# Patient Record
Sex: FEMALE | Race: WHITE | Employment: STUDENT | URBAN - METROPOLITAN AREA
[De-identification: names, ages, dates, MRNs, and addresses within clinical notes are randomized per-mention and may not be internally consistent; named-entity substitution may affect disease eponyms.]

---

## 2019-08-23 ENCOUNTER — DOCTOR'S OFFICE (OUTPATIENT)
Dept: URBAN - METROPOLITAN AREA CLINIC 125 | Facility: CLINIC | Age: 19
Setting detail: OPHTHALMOLOGY
End: 2019-08-23
Payer: COMMERCIAL

## 2019-08-23 ENCOUNTER — RX ONLY (RX ONLY)
Age: 19
End: 2019-08-23

## 2019-08-23 DIAGNOSIS — H52.13: ICD-10-CM

## 2019-08-23 PROBLEM — H10.413 CONJUNCTIVITIS, GIANT PAPILLARY CHRONIC; BOTH EYES: Status: ACTIVE | Noted: 2019-08-23

## 2019-08-23 PROCEDURE — 92004 COMPRE OPH EXAM NEW PT 1/>: CPT | Performed by: OPTOMETRIST

## 2019-08-23 PROCEDURE — 92310 CONTACT LENS FITTING OU: CPT | Performed by: OPTOMETRIST

## 2019-08-23 PROCEDURE — 92015 DETERMINE REFRACTIVE STATE: CPT | Performed by: OPTOMETRIST

## 2019-08-23 ASSESSMENT — AXIALLENGTH_DERIVED
OD_AL: 24.3695
OS_AL: 24.0054
OD_AL: 24.4216
OS_AL: 24.2601

## 2019-08-23 ASSESSMENT — SPHEQUIV_DERIVED
OS_SPHEQUIV: -1.75
OS_SPHEQUIV: -2.375
OD_SPHEQUIV: -2.875
OD_SPHEQUIV: -3

## 2019-08-23 ASSESSMENT — REFRACTION_MANIFEST
OS_VA1: 20/20
OD_VA1: 20/
OU_VA: 20/
OD_VA3: 20/
OD_SPHERE: -2.75
OD_AXIS: 010
OD_VA1: 20/20
OS_VA2: 20/
OS_CYLINDER: -0.75
OU_VA: 20/
OS_VA1: 20/
OD_VA2: 20/
OD_CYLINDER: -0.50
OD_VA2: 20/
OS_VA3: 20/
OS_VA2: 20/
OS_SPHERE: -2.00
OS_AXIS: 015
OD_VA3: 20/
OS_VA3: 20/

## 2019-08-23 ASSESSMENT — REFRACTION_CURRENTRX
OD_OVR_VA: 20/
OD_SPHERE: -2.75
OD_OVR_VA: 20/
OS_AXIS: 155
OS_SPHERE: -2.00
OD_CYLINDER: SPHERE
OS_CYLINDER: -0.25
OS_VPRISM_DIRECTION: SV
OS_OVR_VA: 20/
OD_VPRISM_DIRECTION: SV
OD_OVR_VA: 20/

## 2019-08-23 ASSESSMENT — REFRACTION_AUTOREFRACTION
OD_SPHERE: -2.50
OS_CYLINDER: -1.00
OS_AXIS: 160
OD_AXIS: 009
OS_SPHERE: -1.25
OD_CYLINDER: -0.75

## 2019-08-23 ASSESSMENT — KERATOMETRY
OS_AXISANGLE_DEGREES: 084
OD_K2POWER_DIOPTERS: 45.25
METHOD_AUTO_MANUAL: AUTO
OS_K2POWER_DIOPTERS: 45.00
OD_AXISANGLE_DEGREES: 093
OD_K1POWER_DIOPTERS: 43.75
OS_K1POWER_DIOPTERS: 43.50

## 2019-08-23 ASSESSMENT — CONFRONTATIONAL VISUAL FIELD TEST (CVF)
OS_FINDINGS: FULL
OD_FINDINGS: FULL

## 2019-08-23 ASSESSMENT — VISUAL ACUITY
OD_BCVA: 20/25-2
OS_BCVA: 20/30-2

## 2019-09-25 ENCOUNTER — OPTICAL OFFICE (OUTPATIENT)
Dept: URBAN - METROPOLITAN AREA CLINIC 134 | Facility: CLINIC | Age: 19
Setting detail: OPHTHALMOLOGY
End: 2019-09-25

## 2019-09-25 DIAGNOSIS — H52.13: ICD-10-CM

## 2019-09-25 PROCEDURE — S0500 DISPOS CONT LENS: HCPCS | Performed by: OPTOMETRIST

## 2022-05-19 ENCOUNTER — HOSPITAL ENCOUNTER (OUTPATIENT)
Dept: ULTRASOUND IMAGING | Facility: HOSPITAL | Age: 22
Discharge: HOME | End: 2022-05-19
Payer: COMMERCIAL

## 2022-05-19 DIAGNOSIS — E01.0 IODINE-DEFICIENCY RELATED DIFFUSE (ENDEMIC) GOITER: ICD-10-CM

## 2022-05-19 DIAGNOSIS — E06.3 AUTOIMMUNE THYROIDITIS: ICD-10-CM

## 2022-05-19 PROCEDURE — 76536 US EXAM OF HEAD AND NECK: CPT

## 2022-06-24 ENCOUNTER — HOSPITAL ENCOUNTER (EMERGENCY)
Facility: HOSPITAL | Age: 22
Discharge: HOME | End: 2022-06-24
Attending: EMERGENCY MEDICINE
Payer: COMMERCIAL

## 2022-06-24 VITALS
OXYGEN SATURATION: 100 % | DIASTOLIC BLOOD PRESSURE: 82 MMHG | RESPIRATION RATE: 20 BRPM | SYSTOLIC BLOOD PRESSURE: 132 MMHG | TEMPERATURE: 99.1 F | HEIGHT: 68 IN | BODY MASS INDEX: 21.72 KG/M2 | WEIGHT: 143.3 LBS | HEART RATE: 90 BPM

## 2022-06-24 DIAGNOSIS — H60.391 ACUTE INFECTIVE OTITIS EXTERNA, RIGHT: Primary | ICD-10-CM

## 2022-06-24 DIAGNOSIS — H60.11 CELLULITIS OF EXTERNAL EAR, RIGHT: ICD-10-CM

## 2022-06-24 PROCEDURE — 99284 EMERGENCY DEPT VISIT MOD MDM: CPT | Performed by: EMERGENCY MEDICINE

## 2022-06-24 PROCEDURE — 99283 EMERGENCY DEPT VISIT LOW MDM: CPT

## 2022-06-24 RX ORDER — BUSPIRONE HYDROCHLORIDE 10 MG/1
10 TABLET ORAL
COMMUNITY
Start: 2022-03-21

## 2022-06-24 RX ORDER — ONDANSETRON 8 MG/1
TABLET, ORALLY DISINTEGRATING ORAL AS NEEDED
COMMUNITY
Start: 2022-05-06

## 2022-06-24 RX ORDER — HYDROCODONE BITARTRATE AND ACETAMINOPHEN 5; 325 MG/1; MG/1
1 TABLET ORAL ONCE
Status: COMPLETED | OUTPATIENT
Start: 2022-06-24 | End: 2022-06-24

## 2022-06-24 RX ORDER — AMOXICILLIN AND CLAVULANATE POTASSIUM 875; 125 MG/1; MG/1
1 TABLET, FILM COATED ORAL EVERY 12 HOURS
Qty: 20 TABLET | Refills: 0 | Status: SHIPPED | OUTPATIENT
Start: 2022-06-24 | End: 2022-07-04

## 2022-06-24 RX ORDER — HYDROCODONE BITARTRATE AND ACETAMINOPHEN 5; 325 MG/1; MG/1
1 TABLET ORAL EVERY 6 HOURS PRN
Qty: 10 TABLET | Refills: 0 | Status: SHIPPED | OUTPATIENT
Start: 2022-06-24

## 2022-06-24 RX ORDER — AMOXICILLIN AND CLAVULANATE POTASSIUM 875; 125 MG/1; MG/1
1 TABLET, FILM COATED ORAL ONCE
Status: COMPLETED | OUTPATIENT
Start: 2022-06-24 | End: 2022-06-24

## 2022-06-24 RX ORDER — AMOXICILLIN 500 MG/1
500 TABLET, FILM COATED ORAL 2 TIMES DAILY
COMMUNITY
Start: 2022-06-22 | End: 2022-07-02

## 2022-06-24 RX ORDER — NEOMYCIN SULFATE, POLYMYXIN B SULFATE AND HYDROCORTISONE 10; 3.5; 1 MG/ML; MG/ML; [USP'U]/ML
4 SUSPENSION/ DROPS AURICULAR (OTIC) ONCE
Status: DISCONTINUED | OUTPATIENT
Start: 2022-06-24 | End: 2022-06-24

## 2022-06-24 RX ORDER — CIPROFLOXACIN AND DEXAMETHASONE 3; 1 MG/ML; MG/ML
4 SUSPENSION/ DROPS AURICULAR (OTIC) ONCE
Status: COMPLETED | OUTPATIENT
Start: 2022-06-24 | End: 2022-06-24

## 2022-06-24 RX ORDER — OFLOXACIN 3 MG/ML
10 SOLUTION AURICULAR (OTIC) DAILY
COMMUNITY
Start: 2022-06-22 | End: 2022-06-29

## 2022-06-24 RX ORDER — CIPROFLOXACIN AND DEXAMETHASONE 3; 1 MG/ML; MG/ML
4 SUSPENSION/ DROPS AURICULAR (OTIC) 2 TIMES DAILY
Qty: 7.5 ML | Refills: 0 | Status: SHIPPED | OUTPATIENT
Start: 2022-06-24

## 2022-06-24 RX ORDER — LANSOPRAZOLE 30 MG/1
30 CAPSULE, DELAYED RELEASE ORAL DAILY
COMMUNITY
Start: 2022-05-06

## 2022-06-24 RX ADMIN — HYDROCODONE BITARTRATE AND ACETAMINOPHEN 1 TABLET: 5; 325 TABLET ORAL at 11:44

## 2022-06-24 RX ADMIN — CIPROFLOXACIN AND DEXAMETHASONE 4 DROP: 3; 1 SUSPENSION/ DROPS AURICULAR (OTIC) at 11:40

## 2022-06-24 RX ADMIN — AMOXICILLIN AND CLAVULANATE POTASSIUM 1 TABLET: 875; 125 TABLET, FILM COATED ORAL at 11:39

## 2022-06-24 NOTE — Clinical Note
Torrey Turner was seen and treated in our emergency department on 6/24/2022  No school or work today and tomorrow    Diagnosis:     Rashad Young    She may return on this date: If you have any questions or concerns, please don't hesitate to call        Lyndsey Flores, DO    ______________________________           _______________          _______________  Hospital Representative                              Date                                Time

## 2022-06-24 NOTE — ED PROVIDER NOTES
History  Chief Complaint   Patient presents with    Earache     Pt arrives reporting right ear pain that is worsening over past 3 days  Pt was seen at urgent care and prescribed ABX but is not getting better and now is concerned she is developing infection in right ear as well  Pt last took tylenol at 0600 today  Patient is a 78-year-old female presents emergency department complaining of pain and swelling and discharge from the right ear was started on ear drops ofloxacin and antibiotics amoxicillin for otitis media at urgent care 3 days ago pain in the right ear worsening and not controlled with Tylenol  Patient reports she had been swimming prior to developing the ear infection as well as cleans her ears very aggressively  Pain from ear radiates into the right jaw and neck  No pain or tenderness or swelling behind the ear  History provided by:  Patient and parent  Earache  Location:  Right  Behind ear:  No abnormality  Quality:  Aching and throbbing  Severity:  Moderate  Onset quality:  Gradual  Duration:  4 days  Timing:  Constant  Progression:  Worsening  Context: water in ear    Associated symptoms: ear discharge    Associated symptoms: no abdominal pain, no congestion, no cough, no diarrhea, no fever, no headaches, no rash, no rhinorrhea, no sore throat and no vomiting        Prior to Admission Medications   Prescriptions Last Dose Informant Patient Reported? Taking?   amoxicillin (AMOXIL) 500 MG tablet   Yes Yes   Sig: Take 500 mg by mouth 2 (two) times a day   busPIRone (BUSPAR) 10 mg tablet   Yes Yes   Sig: Take 10 mg by mouth   lansoprazole (PREVACID) 30 mg capsule   Yes Yes   Sig: Take 30 mg by mouth daily   ofloxacin (FLOXIN) 0 3 % otic solution   Yes Yes   Sig: Administer 10 drops into ears daily   ondansetron (ZOFRAN-ODT) 8 mg disintegrating tablet   Yes Yes   Sig: if needed      Facility-Administered Medications: None       History reviewed  No pertinent past medical history      Past Surgical History:   Procedure Laterality Date    REFRACTIVE SURGERY      WISDOM TOOTH EXTRACTION         History reviewed  No pertinent family history  I have reviewed and agree with the history as documented  E-Cigarette/Vaping     E-Cigarette/Vaping Substances     Social History     Tobacco Use    Smoking status: Never Smoker    Smokeless tobacco: Never Used   Substance Use Topics    Alcohol use: Never    Drug use: Never       Review of Systems   Constitutional: Negative for activity change, appetite change, chills, fatigue and fever  HENT: Positive for ear discharge and ear pain  Negative for congestion, rhinorrhea and sore throat  Eyes: Negative for discharge, redness and visual disturbance  Respiratory: Negative for cough, chest tightness, shortness of breath and wheezing  Cardiovascular: Negative for chest pain and palpitations  Gastrointestinal: Negative for abdominal pain, constipation, diarrhea, nausea and vomiting  Endocrine: Negative for polydipsia and polyuria  Genitourinary: Negative for difficulty urinating, dysuria, frequency, hematuria and urgency  Musculoskeletal: Negative for arthralgias and myalgias  Skin: Negative for color change, pallor and rash  Neurological: Negative for dizziness, weakness, light-headedness, numbness and headaches  Hematological: Negative for adenopathy  Does not bruise/bleed easily  All other systems reviewed and are negative  Physical Exam  Physical Exam  Vitals and nursing note reviewed  Constitutional:       Appearance: She is well-developed  HENT:      Head: Normocephalic and atraumatic  Right Ear: Drainage, swelling and tenderness present  A middle ear effusion is present  No mastoid tenderness  Tympanic membrane is erythematous  Left Ear: External ear normal       Nose: Nose normal    Eyes:      Conjunctiva/sclera: Conjunctivae normal       Pupils: Pupils are equal, round, and reactive to light     Cardiovascular: Rate and Rhythm: Normal rate and regular rhythm  Heart sounds: Normal heart sounds  Pulmonary:      Effort: Pulmonary effort is normal  No respiratory distress  Breath sounds: Normal breath sounds  No wheezing or rales  Chest:      Chest wall: No tenderness  Abdominal:      General: Bowel sounds are normal  There is no distension  Palpations: Abdomen is soft  Tenderness: There is no abdominal tenderness  There is no guarding  Musculoskeletal:         General: Normal range of motion  Cervical back: Normal range of motion and neck supple  Skin:     General: Skin is warm and dry  Neurological:      Mental Status: She is alert and oriented to person, place, and time  Cranial Nerves: No cranial nerve deficit  Sensory: No sensory deficit           Vital Signs  ED Triage Vitals   Temperature Pulse Respirations Blood Pressure SpO2   06/24/22 1130 06/24/22 1130 06/24/22 1130 06/24/22 1130 06/24/22 1130   99 1 °F (37 3 °C) (!) 113 18 145/93 100 %      Temp src Heart Rate Source Patient Position - Orthostatic VS BP Location FiO2 (%)   -- 06/24/22 1159 06/24/22 1159 06/24/22 1159 --    Monitor Lying Right arm       Pain Score       06/24/22 1130       9           Vitals:    06/24/22 1130 06/24/22 1159   BP: 145/93 132/82   Pulse: (!) 113 90   Patient Position - Orthostatic VS:  Lying         Visual Acuity      ED Medications  Medications   amoxicillin-clavulanate (AUGMENTIN) 875-125 mg per tablet 1 tablet (1 tablet Oral Given 6/24/22 1139)   ciprofloxacin-dexamethasone (CIPRODEX) 0 3-0 1 % otic suspension 4 drop (4 drops Right Ear Given 6/24/22 1140)   HYDROcodone-acetaminophen (NORCO) 5-325 mg per tablet 1 tablet (1 tablet Oral Given 6/24/22 1144)       Diagnostic Studies  Results Reviewed     None                 No orders to display              Procedures  Procedures         ED Course                               SBIRT 20yo+    Flowsheet Row Most Recent Value   SBIRT (21 yo +)    In order to provide better care to our patients, we are screening all of our patients for alcohol and drug use  Would it be okay to ask you these screening questions? No Filed at: 06/24/2022 1131                    [facility]  Number of Diagnoses or Management Options  Acute infective otitis externa, right: new and does not require workup  Cellulitis of external ear, right: new and does not require workup  Diagnosis management comments: Patient is afebrile nontoxic well-appearing clinically and hemodynamically stable in the emergency department history and examination is consistent with acute otitis externa with cellulitis of the external canal will place on Augmentin for increased antibiotic coverage for now and place on Ciprodex drops for increased anti-inflammatory coverage topically  Patient's primary concern is amount of pain control and difficulty with sleeping will prescribe Norco as additional pain control for now advised Tylenol Motrin and supportive care for less severe pain and recommended prompt follow-up with your and nose and throat referral provided for further evaluation and treatment return precautions and anticipatory guidance discussed           Amount and/or Complexity of Data Reviewed  Decide to obtain previous medical records or to obtain history from someone other than the patient: yes  Review and summarize past medical records: yes    Risk of Complications, Morbidity, and/or Mortality  Presenting problems: low  Management options: low    Patient Progress  Patient progress: stable      Disposition  Final diagnoses:   Acute infective otitis externa, right   Cellulitis of external ear, right     Time reflects when diagnosis was documented in both MDM as applicable and the Disposition within this note     Time User Action Codes Description Comment    6/24/2022 11:37 AM Sury Green Add [H60 391] Acute infective otitis externa, right     6/24/2022 11:37 AM Donnella Norma Add [H60 11] Cellulitis of external ear, right       ED Disposition     ED Disposition   Discharge    Condition   Stable    Date/Time   Fri Jun 24, 2022 11:40 AM    Comment   [facility] discharge to home/self care  Follow-up Information     Follow up With Specialties Details Why Sherron Gaitan MD Otolaryngology Schedule an appointment as soon as possible for a visit in 1 day  [address]  [phone]            Discharge Medication List as of 6/24/2022 11:42 AM      START taking these medications    Details   amoxicillin-clavulanate (AUGMENTIN) 875-125 mg per tablet Take 1 tablet by mouth every 12 (twelve) hours for 10 days, Starting Fri 6/24/2022, Until Mon 7/4/2022, Normal      ciprofloxacin-dexamethasone (CIPRODEX) otic suspension Administer 4 drops to the right ear 2 (two) times a day, Starting Fri 6/24/2022, Normal      HYDROcodone-acetaminophen (NORCO) 5-325 mg per tablet Take 1 tablet by mouth every 6 (six) hours as needed for pain for up to 10 doses Max Daily Amount: 4 tablets, Starting Fri 6/24/2022, Normal         CONTINUE these medications which have NOT CHANGED    Details   amoxicillin (AMOXIL) 500 MG tablet Take 500 mg by mouth 2 (two) times a day, Starting Wed 6/22/2022, Until Sat 7/2/2022, Historical Med      busPIRone (BUSPAR) 10 mg tablet Take 10 mg by mouth, Starting Mon 3/21/2022, Historical Med      lansoprazole (PREVACID) 30 mg capsule Take 30 mg by mouth daily, Starting Fri 5/6/2022, Historical Med      ofloxacin (FLOXIN) 0 3 % otic solution Administer 10 drops into ears daily, Starting Wed 6/22/2022, Until Wed 6/29/2022, Historical Med      ondansetron (ZOFRAN-ODT) 8 mg disintegrating tablet if needed, Starting Fri 5/6/2022, Historical Med                 PDMP Review     None          ED Provider  Electronically Signed by           Dipesh Grider DO  06/24/22 9312

## 2022-06-24 NOTE — Clinical Note
Janet Watson was seen and treated in our emergency department on 6/24/2022  No school or work today and tomorrow    Diagnosis:     Ada Troy    She may return on this date: If you have any questions or concerns, please don't hesitate to call        Carina Leyva, DO    ______________________________           _______________          _______________  Hospital Representative                              Date                                Time

## 2024-01-31 ENCOUNTER — APPOINTMENT (OUTPATIENT)
Dept: LAB | Facility: HOSPITAL | Age: 24
End: 2024-01-31
Payer: COMMERCIAL

## 2024-01-31 DIAGNOSIS — N92.1 METRORRHAGIA: ICD-10-CM

## 2024-01-31 LAB
ALBUMIN SERPL BCP-MCNC: 4.4 G/DL (ref 3.5–5)
ALP SERPL-CCNC: 55 U/L (ref 34–104)
ALT SERPL W P-5'-P-CCNC: 10 U/L (ref 7–52)
ANION GAP SERPL CALCULATED.3IONS-SCNC: 4 MMOL/L
AST SERPL W P-5'-P-CCNC: 15 U/L (ref 13–39)
BASOPHILS # BLD AUTO: 0.07 THOUSANDS/ÂΜL (ref 0–0.1)
BASOPHILS NFR BLD AUTO: 1 % (ref 0–1)
BILIRUB SERPL-MCNC: 0.39 MG/DL (ref 0.2–1)
BUN SERPL-MCNC: 11 MG/DL (ref 5–25)
CALCIUM SERPL-MCNC: 9.2 MG/DL (ref 8.4–10.2)
CHLORIDE SERPL-SCNC: 103 MMOL/L (ref 96–108)
CO2 SERPL-SCNC: 29 MMOL/L (ref 21–32)
CREAT SERPL-MCNC: 0.87 MG/DL (ref 0.6–1.3)
EOSINOPHIL # BLD AUTO: 0.18 THOUSAND/ÂΜL (ref 0–0.61)
EOSINOPHIL NFR BLD AUTO: 2 % (ref 0–6)
ERYTHROCYTE [DISTWIDTH] IN BLOOD BY AUTOMATED COUNT: 13.2 % (ref 11.6–15.1)
ESTRADIOL SERPL-MCNC: 204.4 PG/ML
FSH SERPL-ACNC: 5.4 MIU/ML
GFR SERPL CREATININE-BSD FRML MDRD: 93 ML/MIN/1.73SQ M
GLUCOSE P FAST SERPL-MCNC: 80 MG/DL (ref 65–99)
HCT VFR BLD AUTO: 40.4 % (ref 34.8–46.1)
HGB BLD-MCNC: 13.5 G/DL (ref 11.5–15.4)
IMM GRANULOCYTES # BLD AUTO: 0.03 THOUSAND/UL (ref 0–0.2)
IMM GRANULOCYTES NFR BLD AUTO: 0 % (ref 0–2)
LYMPHOCYTES # BLD AUTO: 1.97 THOUSANDS/ÂΜL (ref 0.6–4.47)
LYMPHOCYTES NFR BLD AUTO: 20 % (ref 14–44)
MCH RBC QN AUTO: 29 PG (ref 26.8–34.3)
MCHC RBC AUTO-ENTMCNC: 33.4 G/DL (ref 31.4–37.4)
MCV RBC AUTO: 87 FL (ref 82–98)
MONOCYTES # BLD AUTO: 0.91 THOUSAND/ÂΜL (ref 0.17–1.22)
MONOCYTES NFR BLD AUTO: 9 % (ref 4–12)
NEUTROPHILS # BLD AUTO: 6.92 THOUSANDS/ÂΜL (ref 1.85–7.62)
NEUTS SEG NFR BLD AUTO: 68 % (ref 43–75)
NRBC BLD AUTO-RTO: 0 /100 WBCS
PLATELET # BLD AUTO: 226 THOUSANDS/UL (ref 149–390)
PMV BLD AUTO: 9.9 FL (ref 8.9–12.7)
POTASSIUM SERPL-SCNC: 4.3 MMOL/L (ref 3.5–5.3)
PROT SERPL-MCNC: 7.3 G/DL (ref 6.4–8.4)
RBC # BLD AUTO: 4.65 MILLION/UL (ref 3.81–5.12)
SODIUM SERPL-SCNC: 136 MMOL/L (ref 135–147)
TESTOST SERPL-MSCNC: 40 NG/DL
TSH SERPL DL<=0.05 MIU/L-ACNC: 1.13 UIU/ML (ref 0.45–4.5)
WBC # BLD AUTO: 10.08 THOUSAND/UL (ref 4.31–10.16)

## 2024-01-31 PROCEDURE — 85025 COMPLETE CBC W/AUTO DIFF WBC: CPT

## 2024-01-31 PROCEDURE — 82670 ASSAY OF TOTAL ESTRADIOL: CPT

## 2024-01-31 PROCEDURE — 83001 ASSAY OF GONADOTROPIN (FSH): CPT

## 2024-01-31 PROCEDURE — 84403 ASSAY OF TOTAL TESTOSTERONE: CPT

## 2024-01-31 PROCEDURE — 80053 COMPREHEN METABOLIC PANEL: CPT

## 2024-01-31 PROCEDURE — 84443 ASSAY THYROID STIM HORMONE: CPT

## 2024-01-31 PROCEDURE — 36415 COLL VENOUS BLD VENIPUNCTURE: CPT
